# Patient Record
Sex: FEMALE
[De-identification: names, ages, dates, MRNs, and addresses within clinical notes are randomized per-mention and may not be internally consistent; named-entity substitution may affect disease eponyms.]

---

## 2022-08-30 ENCOUNTER — APPOINTMENT (OUTPATIENT)
Dept: ENDOCRINOLOGY | Facility: CLINIC | Age: 62
End: 2022-08-30

## 2022-08-30 VITALS
HEIGHT: 61 IN | WEIGHT: 105 LBS | TEMPERATURE: 98.1 F | SYSTOLIC BLOOD PRESSURE: 122 MMHG | OXYGEN SATURATION: 98 % | HEART RATE: 80 BPM | BODY MASS INDEX: 19.83 KG/M2 | DIASTOLIC BLOOD PRESSURE: 78 MMHG

## 2022-08-30 DIAGNOSIS — Z78.9 OTHER SPECIFIED HEALTH STATUS: ICD-10-CM

## 2022-08-30 DIAGNOSIS — Z87.39 PERSONAL HISTORY OF OTHER DISEASES OF THE MUSCULOSKELETAL SYSTEM AND CONNECTIVE TISSUE: ICD-10-CM

## 2022-08-30 DIAGNOSIS — Z82.62 FAMILY HISTORY OF OSTEOPOROSIS: ICD-10-CM

## 2022-08-30 PROCEDURE — 99204 OFFICE O/P NEW MOD 45 MIN: CPT

## 2022-08-30 NOTE — HISTORY OF PRESENT ILLNESS
[FreeTextEntry1] : Mrs. Pineda is a 61 yo F with PMH of osteoporosis, Raynaud's who presents in consultation for osteoporosis/low bone density. \par  \par On bone densitometry performed on 2021, she was noted to have the following findings: Spine T -3.1, hip T-1.7\par Had DXA every 2 years with GYN. 2021 was first diagnosis of osteoporosis, prior on osteopenia. \par \par current meds:\par citracal\par MVI\par \par They have never been treated with bisphosphonates/other osteoporosis medications. They have no history of GERD or dysphagia. They have no dental work planned. UTD with dentist.\par  \par Current treatment: none\par Prior therapies:none\par  \par Family history of osteoporosis:mother and maternal grandmother with osteoporosis (mother  from brain aneurysm)\par History of hip fracture in first-degree relative: none\par Personal history of fracture after age 50 yo: none\par Recurrent falls: none\par Loss of height: maybe some height loss\par She underwent menarche at age 13 years and menopause at age 52. She was not treated with hormone replacement therapy or Evista/ She never received HRT. She did not experience any prolonged periods of amenorrhea during her reproductive years. No issues with fertility\par Breastfeeding history: 8 months each for both children\par Low body weight (<127 lbs): always low BMI, always thin\par History of eating disorder or disordered eating, poor nutrition: none\par They have no history of thyroid or parathyroid disease or kidney stones.\par Exercises regularly - gym and walking most days of week.\par They have not been treated with steroids, anti-epileptic meds, chemotherapy, PPIs, or heparin.\par They do not have a history of rheumatoid arthritis or diabetes mellitus.\par They have not experienced nephrolithiasis.\par They have no known history of malabsorption or celiac disease.\par No tobacco use.\par rare etoh - only in summer, social use only once weekly once\par Caffeine intake: 1 cup coffee daily, 1 tea at night (decaf)\par No history of Pagets, skeletal irradiation, unexplained alk phos elevation \par  \par Exercise: daily at gym, also with walking daily 2 miles on track. \par  \par Regular dental visits: UTD\par  \par Current calcium and Vit D intake: citracal 350 2 tabs daily - has looked into algaecal\par Dietary sources: Kefir, lots of veggies. tries 3 servings day, quinoa\par  \par FH: +family history of osteoporosis; denies calcium disorders, nephrolithiasis, or major fractures.\par SH: Denies tobacco use, alcohol use, or drug use. . \par

## 2022-08-30 NOTE — ASSESSMENT
[Denosumab Therapy] : Risks  and benefits of denosumab therapy were discussed with the patient including eczema, cellulitis, osteonecrosis of the jaw and atypical femur fractures [Bisphosphonate Therapy] : Risks and benefits of bisphosphonate therapy were  discussed with the patient including gastroesophageal irritation, osteonecrosis of the jaw, and atypical femur fractures, and acute phase reaction [Teriparatide/Abaloparatide Therapy] : Risks and benefits of Teriparatide/Abaloparatide therapy were discussed with the patient including potential risk of osteosarcoma [Bisphosphonates] : The patient was instructed to take bisphosphonates on an empty stomach with a full glass of water,and wait at least 30 minutes before eating or lying down [FreeTextEntry1] : 61 yo F with PMH of osteoporosis here to establish care.\par \par #Osteoporosis: risk factors for bone loss include menopause, low BMI, 16 months of breastfeeding in history\par - Recommend initiating osteoporosis therapy. Benefits to treatment discussed include lowering risk of future fractures, lowering risk of kyphosis and compression fractures. Options discussed include PO and IV bisphosphonate, denosumab, anabolics. Patient agrees to start PO bisphosphonate (fosamax) with anticipated duration of therapy being at least 5 years. Will likely start after obtaining DXA today.\par         	- Adverse events discussed:\par -Bisphosphonates: ONJ, atypical femoral fractures, hypocalcemia, bone pain, flu-like illness, acid reflux; discussed possible flu-like illness and renal dysfunction after reclast infusion\par -Denosumab: reported risk of serious infection, cellulitis, rash, rebound bone loss with missed doses\par -teriparatide (Forteo)/abaloparatide (Tymlos): osteosarcoma in rats, nausea, orthostatic hypotension, leg cramps\par - Romosuzumab (Evenity): increased MI/CVA risk\par - DXA today. Repeat in 1-2 years after starting therapy. \par - Last dental visit: summer 2022, no issues\par -Currently no active dental issues. Patient understands that they must notify us before any major dental work such as dental extraction or dental implant. Always notify their dentist/oral surgeon of bone medications\par - Continue calcium and vit D supplements. Discussed that goal calcium intake is 1200 mg per day (2-3 servings, best by diet)\par - Counseled on weight bearing and balance activities for 30 minutes at least 3-4d/week, avoid high impact activity, avoid extreme bending and twisting at the waist\par - Discussed fall precautions, limit caffeine (to <1-2 servings) and excessive alcohol intake\par -Lab evaluation:\par [] Check calcium, vitamin D-25OH, CMP, phos, mag\par [] Secondary work up: CBC, TSH, PTH, celiac panel (tissue transglutaminase Ab IgA/IgG)\par  \par #Vitamin D deficiency:\par - check vitamin D-25OH level. Goal level is 30-50 ng/mL\par - recommend at least 6904-5142 iu daily, will optimize based on level\par  \par Follow up:\par Return in 6 months\par \par

## 2022-08-30 NOTE — PHYSICAL EXAM
[Alert] : alert [Well Nourished] : well nourished [No Acute Distress] : no acute distress [Well Developed] : well developed [Normal Sclera/Conjunctiva] : normal sclera/conjunctiva [EOMI] : extra ocular movement intact [No Proptosis] : no proptosis [Normal Oropharynx] : the oropharynx was normal [Thyroid Not Enlarged] : the thyroid was not enlarged [No Thyroid Nodules] : no palpable thyroid nodules [No Respiratory Distress] : no respiratory distress [No Accessory Muscle Use] : no accessory muscle use [Clear to Auscultation] : lungs were clear to auscultation bilaterally [Normal S1, S2] : normal S1 and S2 [Normal Rate] : heart rate was normal [Regular Rhythm] : with a regular rhythm [No Edema] : no peripheral edema [Pedal Pulses Normal] : the pedal pulses are present [Normal Bowel Sounds] : normal bowel sounds [Not Tender] : non-tender [Not Distended] : not distended [Soft] : abdomen soft [Normal Anterior Cervical Nodes] : no anterior cervical lymphadenopathy [Normal Posterior Cervical Nodes] : no posterior cervical lymphadenopathy [No Spinal Tenderness] : no spinal tenderness [Spine Straight] : spine straight [No Stigmata of Cushings Syndrome] : no stigmata of Cushings Syndrome [Normal Gait] : normal gait [No Involuntary Movements] : no involuntary movements were seen [Normal Strength/Tone] : muscle strength and tone were normal [No Rash] : no rash [No Tremors] : no tremors [Oriented x3] : oriented to person, place, and time [Normal Affect] : the affect was normal [Normal Insight/Judgement] : insight and judgment were intact [Acanthosis Nigricans] : no acanthosis nigricans

## 2022-09-02 LAB
25(OH)D3 SERPL-MCNC: 35.2 NG/ML
ALBUMIN SERPL ELPH-MCNC: 4.9 G/DL
ALP BLD-CCNC: 96 U/L
ALT SERPL-CCNC: 12 U/L
ANION GAP SERPL CALC-SCNC: 12 MMOL/L
AST SERPL-CCNC: 20 U/L
BILIRUB SERPL-MCNC: 0.3 MG/DL
BUN SERPL-MCNC: 18 MG/DL
CALCIUM SERPL-MCNC: 9.9 MG/DL
CALCIUM SERPL-MCNC: 9.9 MG/DL
CHLORIDE SERPL-SCNC: 99 MMOL/L
CO2 SERPL-SCNC: 28 MMOL/L
CREAT SERPL-MCNC: 0.67 MG/DL
EGFR: 99 ML/MIN/1.73M2
GLUCOSE SERPL-MCNC: 90 MG/DL
MAGNESIUM SERPL-MCNC: 2.1 MG/DL
PARATHYROID HORMONE INTACT: 31 PG/ML
PHOSPHATE SERPL-MCNC: 3.6 MG/DL
POTASSIUM SERPL-SCNC: 4.3 MMOL/L
PROT SERPL-MCNC: 7.3 G/DL
SODIUM SERPL-SCNC: 139 MMOL/L
TSH SERPL-ACNC: 2.62 UIU/ML
TTG IGA SER IA-ACNC: 2.4 U/ML
TTG IGA SER-ACNC: NEGATIVE
TTG IGG SER IA-ACNC: 2.2 U/ML
TTG IGG SER IA-ACNC: NEGATIVE

## 2023-02-23 ENCOUNTER — APPOINTMENT (OUTPATIENT)
Dept: ENDOCRINOLOGY | Facility: CLINIC | Age: 63
End: 2023-02-23

## 2023-04-11 ENCOUNTER — APPOINTMENT (OUTPATIENT)
Dept: ENDOCRINOLOGY | Facility: CLINIC | Age: 63
End: 2023-04-11
Payer: COMMERCIAL

## 2023-04-11 VITALS
HEART RATE: 75 BPM | SYSTOLIC BLOOD PRESSURE: 118 MMHG | DIASTOLIC BLOOD PRESSURE: 70 MMHG | HEIGHT: 61 IN | BODY MASS INDEX: 20.2 KG/M2 | WEIGHT: 107 LBS | OXYGEN SATURATION: 98 %

## 2023-04-11 PROCEDURE — 99214 OFFICE O/P EST MOD 30 MIN: CPT

## 2023-04-12 NOTE — HISTORY OF PRESENT ILLNESS
[FreeTextEntry1] : Mrs. Pineda is a 61 yo F with PMH of osteoporosis, Raynaud's who presents in consultation for osteoporosis/low bone density. \par  \par On bone densitometry performed on 2021, she was noted to have the following findings: Spine T -3.1, hip T-1.7\par Had DXA every 2 years with GYN. 2021 was first diagnosis of osteoporosis, prior on osteopenia. \par \par 22 DXA\par L1-L4 T-2.9, Z-1.4, osteoporosis\par LFN T-1.9, Z-0.4 (BMD 0.643), osteopenia\par LTH T-1.7, Z-0.7, osteopenia\par 1/3 forearm T-1.8, Z-0.4\par \par current meds:\par citracal\par MVI\par fosamax started 2022 - takes on  \par \par They have never been treated with bisphosphonates/other osteoporosis medications. They have no history of GERD or dysphagia. They have no dental work planned. UTD with dentist.\par  \par Current treatment: fosamax\par Prior therapies:none\par \par Feels well today.\par Strained muscle left side, seeing pcp. \par no balance issues, no falls \par UTD dental issues, no dental work \par  \par Family history of osteoporosis:mother and maternal grandmother with osteoporosis (mother  from brain aneurysm)\par History of hip fracture in first-degree relative: none\par Personal history of fracture after age 52 yo: none\par Recurrent falls: none\par Loss of height: maybe some height loss\par She underwent menarche at age 13 years and menopause at age 52. She was not treated with hormone replacement therapy or Evista/ She never received HRT. She did not experience any prolonged periods of amenorrhea during her reproductive years. No issues with fertility\par Breastfeeding history: 8 months each for both children\par Low body weight (<127 lbs): always low BMI, always thin\par History of eating disorder or disordered eating, poor nutrition: none\par They have no history of thyroid or parathyroid disease or kidney stones.\par Exercises regularly - gym and walking most days of week.\par They have not been treated with steroids, anti-epileptic meds, chemotherapy, PPIs, or heparin.\par They do not have a history of rheumatoid arthritis or diabetes mellitus.\par They have not experienced nephrolithiasis.\par They have no known history of malabsorption or celiac disease.\par No tobacco use.\par rare etoh - only in summer, social use only once weekly once\par Caffeine intake: 1 cup coffee daily, 1 tea at night (decaf)\par No history of Pagets, skeletal irradiation, unexplained alk phos elevation \par  \par Exercise: daily at gym, also with walking daily 2 miles on track. \par  \par Regular dental visits: UTD\par  \par Current calcium and Vit D intake: citracal 350 2 tabs daily - has looked into algaecal\par Dietary sources: Kefir, lots of veggies. tries 3 servings day, quinoa\par  \par FH: +family history of osteoporosis; denies calcium disorders, nephrolithiasis, or major fractures.\par SH: Denies tobacco use, alcohol use, or drug use. . \par

## 2023-04-12 NOTE — PHYSICAL EXAM
[Alert] : alert [Well Nourished] : well nourished [No Acute Distress] : no acute distress [Well Developed] : well developed [Normal Sclera/Conjunctiva] : normal sclera/conjunctiva [EOMI] : extra ocular movement intact [No Proptosis] : no proptosis [Normal Oropharynx] : the oropharynx was normal [Thyroid Not Enlarged] : the thyroid was not enlarged [No Thyroid Nodules] : no palpable thyroid nodules [No Respiratory Distress] : no respiratory distress [No Accessory Muscle Use] : no accessory muscle use [Clear to Auscultation] : lungs were clear to auscultation bilaterally [Normal S1, S2] : normal S1 and S2 [Normal Rate] : heart rate was normal [Regular Rhythm] : with a regular rhythm [No Edema] : no peripheral edema [Pedal Pulses Normal] : the pedal pulses are present [Normal Bowel Sounds] : normal bowel sounds [Not Tender] : non-tender [Not Distended] : not distended [Soft] : abdomen soft [Normal Anterior Cervical Nodes] : no anterior cervical lymphadenopathy [No Spinal Tenderness] : no spinal tenderness [Spine Straight] : spine straight [No Stigmata of Cushings Syndrome] : no stigmata of Cushings Syndrome [Normal Gait] : normal gait [No Involuntary Movements] : no involuntary movements were seen [Normal Strength/Tone] : muscle strength and tone were normal [No Rash] : no rash [No Tremors] : no tremors [Oriented x3] : oriented to person, place, and time [Normal Affect] : the affect was normal [Normal Insight/Judgement] : insight and judgment were intact [Acanthosis Nigricans] : no acanthosis nigricans

## 2023-04-12 NOTE — ASSESSMENT
[Denosumab Therapy] : Risks  and benefits of denosumab therapy were discussed with the patient including eczema, cellulitis, osteonecrosis of the jaw and atypical femur fractures [Bisphosphonate Therapy] : Risks and benefits of bisphosphonate therapy were  discussed with the patient including gastroesophageal irritation, osteonecrosis of the jaw, and atypical femur fractures, and acute phase reaction [Teriparatide/Abaloparatide Therapy] : Risks and benefits of Teriparatide/Abaloparatide therapy were discussed with the patient including potential risk of osteosarcoma [Bisphosphonates] : The patient was instructed to take bisphosphonates on an empty stomach with a full glass of water,and wait at least 30 minutes before eating or lying down [FreeTextEntry1] : 61 yo F with PMH of osteoporosis here for follow up.\par \par #Osteoporosis: risk factors for bone loss include menopause, low BMI, 16 months of breastfeeding in history\par - Recommend initiating osteoporosis therapy. Benefits to treatment discussed include lowering risk of future fractures, lowering risk of kyphosis and compression fractures. Options discussed include PO and IV bisphosphonate, denosumab, anabolics. Patient agreed to start PO bisphosphonate (fosamax) with anticipated duration of therapy being at least 5 years. Started 8/2022.\par         	- Adverse events discussed:\par -Bisphosphonates: ONJ, atypical femoral fractures, hypocalcemia, bone pain, flu-like illness, acid reflux; discussed possible flu-like illness and renal dysfunction after reclast infusion\par -Denosumab: reported risk of serious infection, cellulitis, rash, rebound bone loss with missed doses\par -teriparatide (Forteo)/abaloparatide (Tymlos): osteosarcoma in rats, nausea, orthostatic hypotension, leg cramps\par - Romosuzumab (Evenity): increased MI/CVA risk\par - DXA -. Repeat in 1-2 years after starting therapy. \par - Last dental visit: UTD, no issues\par -Currently no active dental issues. Patient understands that they must notify us before any major dental work such as dental extraction or dental implant. Always notify their dentist/oral surgeon of bone medications\par - Continue calcium and vit D supplements. Discussed that goal calcium intake is 1200 mg per day (2-3 servings, best by diet)\par - Counseled on weight bearing and balance activities for 30 minutes at least 3-4d/week, avoid high impact activity, avoid extreme bending and twisting at the waist\par - Discussed fall precautions, limit caffeine (to <1-2 servings) and excessive alcohol intake\par -Lab evaluation: secondary cause workup normal\par \par  \par #Vitamin D deficiency:\par - check vitamin D-25OH level. Goal level is 30-50 ng/mL\par - recommend at least 1476-9799 iu daily, will optimize based on level\par \par #HLD\par - check lipids, cmp, a1c\par - not on statin\par - continue exercise plan\par  \par Follow up:\par Return in 6 months\par \par

## 2023-04-14 ENCOUNTER — NON-APPOINTMENT (OUTPATIENT)
Age: 63
End: 2023-04-14

## 2023-04-14 LAB
25(OH)D3 SERPL-MCNC: 30.6 NG/ML
ALBUMIN SERPL ELPH-MCNC: 5 G/DL
ALP BLD-CCNC: 74 U/L
ALT SERPL-CCNC: 15 U/L
ANION GAP SERPL CALC-SCNC: 11 MMOL/L
AST SERPL-CCNC: 20 U/L
BILIRUB SERPL-MCNC: 0.4 MG/DL
BUN SERPL-MCNC: 16 MG/DL
CALCIUM SERPL-MCNC: 10.3 MG/DL
CHLORIDE SERPL-SCNC: 96 MMOL/L
CHOLEST SERPL-MCNC: 254 MG/DL
CO2 SERPL-SCNC: 29 MMOL/L
CREAT SERPL-MCNC: 0.68 MG/DL
EGFR: 99 ML/MIN/1.73M2
ESTIMATED AVERAGE GLUCOSE: 111 MG/DL
GLUCOSE SERPL-MCNC: 94 MG/DL
HBA1C MFR BLD HPLC: 5.5 %
HDLC SERPL-MCNC: 107 MG/DL
LDLC SERPL CALC-MCNC: 118 MG/DL
NONHDLC SERPL-MCNC: 147 MG/DL
POTASSIUM SERPL-SCNC: 4 MMOL/L
PROT SERPL-MCNC: 7.4 G/DL
SODIUM SERPL-SCNC: 136 MMOL/L
TRIGL SERPL-MCNC: 141 MG/DL

## 2023-10-12 ENCOUNTER — APPOINTMENT (OUTPATIENT)
Dept: ENDOCRINOLOGY | Facility: CLINIC | Age: 63
End: 2023-10-12
Payer: COMMERCIAL

## 2023-10-12 VITALS
DIASTOLIC BLOOD PRESSURE: 70 MMHG | HEIGHT: 61 IN | OXYGEN SATURATION: 98 % | WEIGHT: 106 LBS | HEART RATE: 75 BPM | SYSTOLIC BLOOD PRESSURE: 110 MMHG | BODY MASS INDEX: 20.01 KG/M2

## 2023-10-12 DIAGNOSIS — E78.5 HYPERLIPIDEMIA, UNSPECIFIED: ICD-10-CM

## 2023-10-12 DIAGNOSIS — M81.0 AGE-RELATED OSTEOPOROSIS W/OUT CURRENT PATHOLOGICAL FRACTURE: ICD-10-CM

## 2023-10-12 PROCEDURE — 99214 OFFICE O/P EST MOD 30 MIN: CPT

## 2023-10-13 PROBLEM — E78.5 HYPERLIPIDEMIA: Status: ACTIVE | Noted: 2023-04-11

## 2023-10-13 PROBLEM — M81.0 OSTEOPOROSIS: Status: ACTIVE | Noted: 2022-08-29

## 2023-10-20 LAB
25(OH)D3 SERPL-MCNC: 34.2 NG/ML
ANION GAP SERPL CALC-SCNC: 12 MMOL/L
BUN SERPL-MCNC: 13 MG/DL
CALCIUM SERPL-MCNC: 10.4 MG/DL
CHLORIDE SERPL-SCNC: 100 MMOL/L
CHOLEST SERPL-MCNC: 264 MG/DL
CO2 SERPL-SCNC: 27 MMOL/L
CREAT SERPL-MCNC: 0.68 MG/DL
EGFR: 98 ML/MIN/1.73M2
ESTIMATED AVERAGE GLUCOSE: 117 MG/DL
GLUCOSE SERPL-MCNC: 91 MG/DL
HBA1C MFR BLD HPLC: 5.7 %
HDLC SERPL-MCNC: 117 MG/DL
LDLC SERPL CALC-MCNC: 138 MG/DL
NONHDLC SERPL-MCNC: 147 MG/DL
POTASSIUM SERPL-SCNC: 4 MMOL/L
SODIUM SERPL-SCNC: 139 MMOL/L
TRIGL SERPL-MCNC: 59 MG/DL

## 2024-03-13 ENCOUNTER — RX RENEWAL (OUTPATIENT)
Age: 64
End: 2024-03-13

## 2024-04-10 RX ORDER — ALENDRONATE SODIUM 70 MG/1
70 TABLET ORAL
Qty: 12 | Refills: 1 | Status: ACTIVE | COMMUNITY
Start: 2022-09-01 | End: 1900-01-01

## 2024-09-09 ENCOUNTER — APPOINTMENT (OUTPATIENT)
Dept: ENDOCRINOLOGY | Facility: CLINIC | Age: 64
End: 2024-09-09
Payer: COMMERCIAL

## 2024-09-09 VITALS
BODY MASS INDEX: 20.39 KG/M2 | HEIGHT: 61 IN | HEART RATE: 75 BPM | SYSTOLIC BLOOD PRESSURE: 140 MMHG | OXYGEN SATURATION: 99 % | DIASTOLIC BLOOD PRESSURE: 90 MMHG | WEIGHT: 108 LBS

## 2024-09-09 VITALS — DIASTOLIC BLOOD PRESSURE: 88 MMHG | SYSTOLIC BLOOD PRESSURE: 128 MMHG

## 2024-09-09 DIAGNOSIS — M81.0 AGE-RELATED OSTEOPOROSIS W/OUT CURRENT PATHOLOGICAL FRACTURE: ICD-10-CM

## 2024-09-09 DIAGNOSIS — E78.5 HYPERLIPIDEMIA, UNSPECIFIED: ICD-10-CM

## 2024-09-09 PROCEDURE — G2211 COMPLEX E/M VISIT ADD ON: CPT | Mod: NC

## 2024-09-09 PROCEDURE — 99214 OFFICE O/P EST MOD 30 MIN: CPT

## 2024-09-09 NOTE — HISTORY OF PRESENT ILLNESS
[FreeTextEntry1] : Mrs. Pineda is a 63 yo F with PMH of osteoporosis, Raynaud's who presents for follow up for osteoporosis/low bone density.    On bone densitometry performed on 2021, she was noted to have the following findings: Spine T -3.1, hip T-1.7 Had DXA every 2 years with GYN. 2021 was first diagnosis of osteoporosis, prior on osteopenia.   22 DXA L1-L4 T-2.9, Z-1.4, osteoporosis LFN T-1.9, Z-0.4 (BMD 0.643), osteopenia LTH T-1.7, Z-0.7, osteopenia 1/3 forearm T-1.8, Z-0.4  23 DXA- [facility]- [employer], same machine as  L1-L4 T-2.7 (+6.2%*) improved, T was -3.1 LFN T-1.6 stable LTH T-1.2 (+3.1%) stable osteopenia VFA - no vertebral fractures  current meds: citracal MVI fosamax started 2022 - takes on    They have never been treated with bisphosphonates/other osteoporosis medications. They have no history of GERD or dysphagia. They have no dental work planned. UTD with dentist.   Current treatment: fosamax Prior therapies: none  Family history of osteoporosis: mother and maternal grandmother with osteoporosis (mother  from brain aneurysm) History of hip fracture in first-degree relative: none Personal history of fracture after age 50 yo: none Recurrent falls: none Loss of height: maybe some height loss She underwent menarche at age 13 years and menopause at age 52. She was not treated with hormone replacement therapy or Evista/ She never received HRT. She did not experience any prolonged periods of amenorrhea during her reproductive years. No issues with fertility Breastfeeding history: 8 months each for both children Low body weight (<127 lbs): always low BMI, always thin History of eating disorder or disordered eating, poor nutrition: none They have no history of thyroid or parathyroid disease or kidney stones. Exercises regularly - gym and walking most days of week. They have not been treated with steroids, anti-epileptic meds, chemotherapy, PPIs, or heparin. They do not have a history of rheumatoid arthritis or diabetes mellitus. They have not experienced nephrolithiasis. They have no known history of malabsorption or celiac disease. No tobacco use. rare etoh - only in summer, social use only once weekly once Caffeine intake: 1 cup coffee daily, 1 tea at night (decaf) No history of Pagets, skeletal irradiation, unexplained alk phos elevation    Exercise: daily at gym, also with walking daily 2 miles on track.    Regular dental visits: UTD   Current calcium and Vit D intake: taking vit D 1000 iu + MVI + taking calcium every other day Dietary sources: Kefir, lots of veggies. tries 3 servings day, quinoa   FH: +family history of osteoporosis; denies calcium disorders, nephrolithiasis, or major fractures. SH: Denies tobacco use, alcohol use, or drug use.  - retired

## 2024-09-09 NOTE — ASSESSMENT
[Denosumab Therapy] : Risks  and benefits of denosumab therapy were discussed with the patient including eczema, cellulitis, osteonecrosis of the jaw and atypical femur fractures [Bisphosphonate Therapy] : Risks and benefits of bisphosphonate therapy were  discussed with the patient including gastroesophageal irritation, osteonecrosis of the jaw, and atypical femur fractures, and acute phase reaction [Teriparatide/Abaloparatide Therapy] : Risks and benefits of Teriparatide/Abaloparatide therapy were discussed with the patient including potential risk of osteosarcoma [Bisphosphonates] : The patient was instructed to take bisphosphonates on an empty stomach with a full glass of water,and wait at least 30 minutes before eating or lying down [FreeTextEntry1] : 65 yo F with PMH of osteoporosis here for follow up.  #Osteoporosis: risk factors for bone loss include menopause, low BMI, 16 months of breastfeeding in history - Recommended osteoporosis therapy. Benefits to treatment discussed include lowering risk of future fractures, lowering risk of kyphosis and compression fractures. Options discussed include PO and IV bisphosphonate, denosumab, anabolics. Patient agreed to start PO bisphosphonate (fosamax) with anticipated duration of therapy being at least 5 years.  - Fosamax started 8/2022. Will continue x 5 years  - DXA in 9/2023 showing significant improvement in spine. Has stable osteopenia in hip. Repeat DXA in 9/2025 - discussed dietary calcium - chapter MVI - continue  - continue vit D supplements - no dental work planned     - Adverse events discussed: -Bisphosphonates: ONJ, atypical femoral fractures, hypocalcemia, bone pain, flu-like illness, acid reflux; discussed possible flu-like illness and renal dysfunction after reclast infusion -Denosumab: reported risk of serious infection, cellulitis, rash, rebound bone loss with missed doses -teriparatide (Forteo)/abaloparatide (Tymlos): osteosarcoma in rats, nausea, orthostatic hypotension, leg cramps - Romosuzumab (Evenity): increased MI/CVA risk - DXA -9/2025 due  - Last dental visit: UTD, no issues -Currently no active dental issues. Patient understands that they must notify us before any major dental work such as dental extraction or dental implant. Always notify their dentist/oral surgeon of bone medications - Continue calcium and vit D supplements. Discussed that goal calcium intake is 1200 mg per day (2-3 servings, best by diet) - Counseled on weight bearing and balance activities for 30 minutes at least 3-4d/week, avoid high impact activity, avoid extreme bending and twisting at the waist - Discussed fall precautions, limit caffeine (to <1-2 servings) and excessive alcohol intake -Lab evaluation: secondary cause workup normal  #Vitamin D deficiency: - monitor vitamin D-25OH level. Goal level is 30-50 ng/mL - recommend at least 1000 daily  #HLD - monitor lipids, cmp, a1c - on rosuvastatin 20 mg as of 8/2024 - continue exercise plan  #BP monitoring diastolic BP elevated  monitor BP at home follow up with continue exercise   Follow up: 1 year  PCP [facility] rodrigo Pearce [phone]

## 2024-09-09 NOTE — DATA REVIEWED
[FreeTextEntry1] : 8/2024 on phone from PCP creat 0.6 calcium 9.7 alk phos 68 egf 100 a1c 5.5 tsh 2.52 tchol 274, hdl 105, ldl  146, non hdl 169, tg 116, vit d 25.7

## 2025-07-21 ENCOUNTER — RX RENEWAL (OUTPATIENT)
Age: 65
End: 2025-07-21

## 2025-09-08 ENCOUNTER — APPOINTMENT (OUTPATIENT)
Dept: ENDOCRINOLOGY | Facility: CLINIC | Age: 65
End: 2025-09-08
Payer: COMMERCIAL

## 2025-09-08 VITALS
HEIGHT: 61 IN | OXYGEN SATURATION: 99 % | BODY MASS INDEX: 20.58 KG/M2 | HEART RATE: 55 BPM | SYSTOLIC BLOOD PRESSURE: 116 MMHG | WEIGHT: 109 LBS | DIASTOLIC BLOOD PRESSURE: 80 MMHG

## 2025-09-08 DIAGNOSIS — M81.0 AGE-RELATED OSTEOPOROSIS W/OUT CURRENT PATHOLOGICAL FRACTURE: ICD-10-CM

## 2025-09-08 DIAGNOSIS — E78.5 HYPERLIPIDEMIA, UNSPECIFIED: ICD-10-CM

## 2025-09-08 PROCEDURE — 99214 OFFICE O/P EST MOD 30 MIN: CPT

## 2025-09-08 PROCEDURE — G2211 COMPLEX E/M VISIT ADD ON: CPT | Mod: NC
